# Patient Record
Sex: FEMALE | Race: WHITE | URBAN - NONMETROPOLITAN AREA
[De-identification: names, ages, dates, MRNs, and addresses within clinical notes are randomized per-mention and may not be internally consistent; named-entity substitution may affect disease eponyms.]

---

## 2019-01-01 ENCOUNTER — APPOINTMENT (OUTPATIENT)
Dept: GENERAL RADIOLOGY | Facility: OTHER | Age: 84
End: 2019-01-01
Attending: FAMILY MEDICINE
Payer: MEDICARE

## 2019-01-01 ENCOUNTER — HOSPITAL ENCOUNTER (EMERGENCY)
Facility: OTHER | Age: 84
Discharge: HOME | End: 2019-08-25
Attending: FAMILY MEDICINE | Admitting: FAMILY MEDICINE
Payer: MEDICARE

## 2019-01-01 ENCOUNTER — APPOINTMENT (OUTPATIENT)
Dept: GENERAL RADIOLOGY | Facility: OTHER | Age: 84
End: 2019-01-01
Attending: PHYSICIAN ASSISTANT
Payer: MEDICARE

## 2019-01-01 ENCOUNTER — HOSPITAL ENCOUNTER (EMERGENCY)
Facility: OTHER | Age: 84
Discharge: HOME | End: 2019-08-23
Attending: PHYSICIAN ASSISTANT | Admitting: PHYSICIAN ASSISTANT
Payer: MEDICARE

## 2019-01-01 VITALS
OXYGEN SATURATION: 94 % | DIASTOLIC BLOOD PRESSURE: 88 MMHG | WEIGHT: 151.3 LBS | BODY MASS INDEX: 25.83 KG/M2 | HEIGHT: 64 IN | HEART RATE: 73 BPM | TEMPERATURE: 98.1 F | SYSTOLIC BLOOD PRESSURE: 136 MMHG | RESPIRATION RATE: 7 BRPM

## 2019-01-01 VITALS
HEART RATE: 83 BPM | OXYGEN SATURATION: 95 % | SYSTOLIC BLOOD PRESSURE: 153 MMHG | BODY MASS INDEX: 25.75 KG/M2 | WEIGHT: 150 LBS | DIASTOLIC BLOOD PRESSURE: 74 MMHG | TEMPERATURE: 96.4 F | RESPIRATION RATE: 16 BRPM

## 2019-01-01 DIAGNOSIS — F03.90 SENILE DEMENTIA WITHOUT BEHAVIORAL DISTURBANCE (H): ICD-10-CM

## 2019-01-01 DIAGNOSIS — M62.81 MUSCLE WEAKNESS (GENERALIZED): ICD-10-CM

## 2019-01-01 DIAGNOSIS — R07.89 ATYPICAL CHEST PAIN: ICD-10-CM

## 2019-01-01 DIAGNOSIS — F05 SUNDOWNING: ICD-10-CM

## 2019-01-01 LAB
ALBUMIN SERPL-MCNC: 3.3 G/DL (ref 3.5–5.7)
ALBUMIN UR-MCNC: NEGATIVE MG/DL
ALP SERPL-CCNC: 82 U/L (ref 34–104)
ALT SERPL W P-5'-P-CCNC: 7 U/L (ref 7–52)
ANION GAP SERPL CALCULATED.3IONS-SCNC: 5 MMOL/L (ref 3–14)
ANION GAP SERPL CALCULATED.3IONS-SCNC: 5 MMOL/L (ref 3–14)
APPEARANCE UR: CLEAR
AST SERPL W P-5'-P-CCNC: 9 U/L (ref 13–39)
BACTERIA SPEC CULT: NORMAL
BASOPHILS # BLD AUTO: 0 10E9/L (ref 0–0.2)
BASOPHILS # BLD AUTO: 0.1 10E9/L (ref 0–0.2)
BASOPHILS NFR BLD AUTO: 0.4 %
BASOPHILS NFR BLD AUTO: 0.5 %
BILIRUB SERPL-MCNC: 0.3 MG/DL (ref 0.3–1)
BILIRUB UR QL STRIP: NEGATIVE
BUN SERPL-MCNC: 14 MG/DL (ref 7–25)
BUN SERPL-MCNC: 18 MG/DL (ref 7–25)
CALCIUM SERPL-MCNC: 8.9 MG/DL (ref 8.6–10.3)
CALCIUM SERPL-MCNC: 9 MG/DL (ref 8.6–10.3)
CHLORIDE SERPL-SCNC: 103 MMOL/L (ref 98–107)
CHLORIDE SERPL-SCNC: 104 MMOL/L (ref 98–107)
CO2 SERPL-SCNC: 27 MMOL/L (ref 21–31)
CO2 SERPL-SCNC: 29 MMOL/L (ref 21–31)
COLOR UR AUTO: YELLOW
CREAT SERPL-MCNC: 0.73 MG/DL (ref 0.6–1.2)
CREAT SERPL-MCNC: 0.73 MG/DL (ref 0.6–1.2)
DIFFERENTIAL METHOD BLD: ABNORMAL
DIFFERENTIAL METHOD BLD: NORMAL
EOSINOPHIL # BLD AUTO: 0.1 10E9/L (ref 0–0.7)
EOSINOPHIL # BLD AUTO: 0.1 10E9/L (ref 0–0.7)
EOSINOPHIL NFR BLD AUTO: 0.9 %
EOSINOPHIL NFR BLD AUTO: 1.2 %
ERYTHROCYTE [DISTWIDTH] IN BLOOD BY AUTOMATED COUNT: 13.4 % (ref 10–15)
ERYTHROCYTE [DISTWIDTH] IN BLOOD BY AUTOMATED COUNT: 13.5 % (ref 10–15)
GFR SERPL CREATININE-BSD FRML MDRD: 75 ML/MIN/{1.73_M2}
GFR SERPL CREATININE-BSD FRML MDRD: 75 ML/MIN/{1.73_M2}
GLUCOSE SERPL-MCNC: 125 MG/DL (ref 70–105)
GLUCOSE SERPL-MCNC: 94 MG/DL (ref 70–105)
GLUCOSE UR STRIP-MCNC: NEGATIVE MG/DL
HCT VFR BLD AUTO: 42.4 % (ref 35–47)
HCT VFR BLD AUTO: 43.4 % (ref 35–47)
HGB BLD-MCNC: 13.8 G/DL (ref 11.7–15.7)
HGB BLD-MCNC: 14 G/DL (ref 11.7–15.7)
HGB UR QL STRIP: NEGATIVE
IMM GRANULOCYTES # BLD: 0.1 10E9/L (ref 0–0.4)
IMM GRANULOCYTES # BLD: 0.1 10E9/L (ref 0–0.4)
IMM GRANULOCYTES NFR BLD: 1.1 %
IMM GRANULOCYTES NFR BLD: 1.2 %
INR PPP: 1.09 (ref 0–1.3)
INR PPP: 1.12 (ref 0–1.3)
KETONES UR STRIP-MCNC: NEGATIVE MG/DL
LACTATE SERPL-SCNC: 0.9 MMOL/L (ref 0.5–2.2)
LEUKOCYTE ESTERASE UR QL STRIP: NEGATIVE
LYMPHOCYTES # BLD AUTO: 1.9 10E9/L (ref 0.8–5.3)
LYMPHOCYTES # BLD AUTO: 2.2 10E9/L (ref 0.8–5.3)
LYMPHOCYTES NFR BLD AUTO: 19.3 %
LYMPHOCYTES NFR BLD AUTO: 19.9 %
MCH RBC QN AUTO: 30.8 PG (ref 26.5–33)
MCH RBC QN AUTO: 31 PG (ref 26.5–33)
MCHC RBC AUTO-ENTMCNC: 32.3 G/DL (ref 31.5–36.5)
MCHC RBC AUTO-ENTMCNC: 32.5 G/DL (ref 31.5–36.5)
MCV RBC AUTO: 95 FL (ref 78–100)
MCV RBC AUTO: 95 FL (ref 78–100)
MONOCYTES # BLD AUTO: 0.8 10E9/L (ref 0–1.3)
MONOCYTES # BLD AUTO: 0.9 10E9/L (ref 0–1.3)
MONOCYTES NFR BLD AUTO: 6.7 %
MONOCYTES NFR BLD AUTO: 9.1 %
NEUTROPHILS # BLD AUTO: 6.6 10E9/L (ref 1.6–8.3)
NEUTROPHILS # BLD AUTO: 8 10E9/L (ref 1.6–8.3)
NEUTROPHILS NFR BLD AUTO: 68.2 %
NEUTROPHILS NFR BLD AUTO: 71.5 %
NITRATE UR QL: NEGATIVE
NT-PROBNP SERPL-MCNC: 141 PG/ML (ref 0–100)
PH UR STRIP: 6.5 PH (ref 5–9)
PLATELET # BLD AUTO: 198 10E9/L (ref 150–450)
PLATELET # BLD AUTO: 223 10E9/L (ref 150–450)
POTASSIUM SERPL-SCNC: 4.5 MMOL/L (ref 3.5–5.1)
POTASSIUM SERPL-SCNC: 4.6 MMOL/L (ref 3.5–5.1)
PROT SERPL-MCNC: 5.4 G/DL (ref 6.4–8.9)
RBC # BLD AUTO: 4.45 10E12/L (ref 3.8–5.2)
RBC # BLD AUTO: 4.55 10E12/L (ref 3.8–5.2)
SODIUM SERPL-SCNC: 136 MMOL/L (ref 134–144)
SODIUM SERPL-SCNC: 137 MMOL/L (ref 134–144)
SOURCE: NORMAL
SP GR UR STRIP: 1.01 (ref 1–1.03)
SPECIMEN SOURCE: NORMAL
TROPONIN I SERPL-MCNC: <0.03 UG/L (ref 0–0.03)
TSH SERPL DL<=0.05 MIU/L-ACNC: 2.54 IU/ML (ref 0.34–5.6)
UROBILINOGEN UR STRIP-ACNC: 0.2 EU/DL (ref 0.2–1)
WBC # BLD AUTO: 11.2 10E9/L (ref 4–11)
WBC # BLD AUTO: 9.7 10E9/L (ref 4–11)

## 2019-01-01 PROCEDURE — 84484 ASSAY OF TROPONIN QUANT: CPT | Performed by: PHYSICIAN ASSISTANT

## 2019-01-01 PROCEDURE — 85610 PROTHROMBIN TIME: CPT | Performed by: FAMILY MEDICINE

## 2019-01-01 PROCEDURE — 87086 URINE CULTURE/COLONY COUNT: CPT | Performed by: FAMILY MEDICINE

## 2019-01-01 PROCEDURE — 93005 ELECTROCARDIOGRAM TRACING: CPT | Performed by: FAMILY MEDICINE

## 2019-01-01 PROCEDURE — 84484 ASSAY OF TROPONIN QUANT: CPT | Mod: 91 | Performed by: PHYSICIAN ASSISTANT

## 2019-01-01 PROCEDURE — 85025 COMPLETE CBC W/AUTO DIFF WBC: CPT | Performed by: PHYSICIAN ASSISTANT

## 2019-01-01 PROCEDURE — 85610 PROTHROMBIN TIME: CPT | Performed by: PHYSICIAN ASSISTANT

## 2019-01-01 PROCEDURE — 83880 ASSAY OF NATRIURETIC PEPTIDE: CPT | Performed by: PHYSICIAN ASSISTANT

## 2019-01-01 PROCEDURE — 93010 ELECTROCARDIOGRAM REPORT: CPT | Performed by: INTERNAL MEDICINE

## 2019-01-01 PROCEDURE — 99283 EMERGENCY DEPT VISIT LOW MDM: CPT | Mod: Z6 | Performed by: FAMILY MEDICINE

## 2019-01-01 PROCEDURE — 36415 COLL VENOUS BLD VENIPUNCTURE: CPT | Performed by: PHYSICIAN ASSISTANT

## 2019-01-01 PROCEDURE — 84484 ASSAY OF TROPONIN QUANT: CPT | Performed by: FAMILY MEDICINE

## 2019-01-01 PROCEDURE — 99285 EMERGENCY DEPT VISIT HI MDM: CPT | Mod: 25 | Performed by: PHYSICIAN ASSISTANT

## 2019-01-01 PROCEDURE — 81003 URINALYSIS AUTO W/O SCOPE: CPT | Mod: XU | Performed by: FAMILY MEDICINE

## 2019-01-01 PROCEDURE — 25000132 ZZH RX MED GY IP 250 OP 250 PS 637: Mod: GY | Performed by: PHYSICIAN ASSISTANT

## 2019-01-01 PROCEDURE — 71045 X-RAY EXAM CHEST 1 VIEW: CPT

## 2019-01-01 PROCEDURE — 80053 COMPREHEN METABOLIC PANEL: CPT | Performed by: PHYSICIAN ASSISTANT

## 2019-01-01 PROCEDURE — 99283 EMERGENCY DEPT VISIT LOW MDM: CPT | Mod: Z6 | Performed by: PHYSICIAN ASSISTANT

## 2019-01-01 PROCEDURE — 80048 BASIC METABOLIC PNL TOTAL CA: CPT | Performed by: FAMILY MEDICINE

## 2019-01-01 PROCEDURE — 81003 URINALYSIS AUTO W/O SCOPE: CPT | Performed by: FAMILY MEDICINE

## 2019-01-01 PROCEDURE — 36415 COLL VENOUS BLD VENIPUNCTURE: CPT | Mod: 91,GZ | Performed by: PHYSICIAN ASSISTANT

## 2019-01-01 PROCEDURE — 99285 EMERGENCY DEPT VISIT HI MDM: CPT | Mod: 25 | Performed by: FAMILY MEDICINE

## 2019-01-01 PROCEDURE — 36415 COLL VENOUS BLD VENIPUNCTURE: CPT | Performed by: FAMILY MEDICINE

## 2019-01-01 PROCEDURE — 85025 COMPLETE CBC W/AUTO DIFF WBC: CPT | Performed by: FAMILY MEDICINE

## 2019-01-01 PROCEDURE — 83605 ASSAY OF LACTIC ACID: CPT | Performed by: FAMILY MEDICINE

## 2019-01-01 PROCEDURE — 84443 ASSAY THYROID STIM HORMONE: CPT | Performed by: PHYSICIAN ASSISTANT

## 2019-01-01 PROCEDURE — 93005 ELECTROCARDIOGRAM TRACING: CPT | Performed by: PHYSICIAN ASSISTANT

## 2019-01-01 RX ORDER — CITALOPRAM HYDROBROMIDE 20 MG/1
20 TABLET ORAL
COMMUNITY
Start: 2017-10-13

## 2019-01-01 RX ORDER — MEMANTINE HYDROCHLORIDE 5 MG/1
5 TABLET ORAL
COMMUNITY
Start: 2019-01-01

## 2019-01-01 RX ORDER — DONEPEZIL HYDROCHLORIDE 10 MG/1
10 TABLET, FILM COATED ORAL
COMMUNITY
Start: 2018-05-25

## 2019-01-01 RX ORDER — ATENOLOL 50 MG/1
50 TABLET ORAL
COMMUNITY
Start: 2018-04-03

## 2019-01-01 RX ORDER — TRAZODONE HYDROCHLORIDE 50 MG/1
50 TABLET, FILM COATED ORAL
COMMUNITY
Start: 2019-01-01

## 2019-01-01 RX ORDER — DULOXETIN HYDROCHLORIDE 20 MG/1
20 CAPSULE, DELAYED RELEASE ORAL
COMMUNITY
Start: 2019-01-01

## 2019-01-01 RX ORDER — DIVALPROEX SODIUM 125 MG/1
250 CAPSULE, COATED PELLETS ORAL
COMMUNITY
Start: 2019-01-01

## 2019-01-01 RX ORDER — MIRTAZAPINE 15 MG/1
15 TABLET, FILM COATED ORAL
COMMUNITY
Start: 2019-01-01

## 2019-01-01 RX ORDER — LORAZEPAM 0.5 MG/1
0.5 TABLET ORAL EVERY 6 HOURS PRN
Qty: 10 TABLET | Refills: 0 | Status: SHIPPED | OUTPATIENT
Start: 2019-01-01

## 2019-01-01 RX ORDER — RISPERIDONE 0.25 MG/1
TABLET ORAL
COMMUNITY
Start: 2019-01-01

## 2019-01-01 RX ORDER — LORAZEPAM 0.5 MG/1
0.5 TABLET ORAL ONCE
Status: COMPLETED | OUTPATIENT
Start: 2019-01-01 | End: 2019-01-01

## 2019-01-01 RX ADMIN — LORAZEPAM 0.5 MG: 0.5 TABLET ORAL at 18:46

## 2019-01-01 ASSESSMENT — ENCOUNTER SYMPTOMS
SHORTNESS OF BREATH: 0
ADENOPATHY: 0
SORE THROAT: 0
VOMITING: 0
CHEST TIGHTNESS: 1
BRUISES/BLEEDS EASILY: 0
CONSTIPATION: 0
CONFUSION: 0
HEMATURIA: 0
DIARRHEA: 0
WOUND: 0
LIGHT-HEADEDNESS: 0
CHILLS: 0
RHINORRHEA: 0
FEVER: 0
HEADACHES: 0
DIZZINESS: 0
FACIAL SWELLING: 0
BACK PAIN: 0
TROUBLE SWALLOWING: 0
NAUSEA: 0
ABDOMINAL PAIN: 0

## 2019-01-01 ASSESSMENT — MIFFLIN-ST. JEOR: SCORE: 1096.29

## 2019-02-07 ENCOUNTER — RECORDS - HEALTHEAST (OUTPATIENT)
Dept: LAB | Facility: CLINIC | Age: 84
End: 2019-02-07

## 2019-02-07 LAB
ANION GAP SERPL CALCULATED.3IONS-SCNC: 7 MMOL/L (ref 5–18)
BUN SERPL-MCNC: 15 MG/DL (ref 8–28)
CALCIUM SERPL-MCNC: 9.6 MG/DL (ref 8.5–10.5)
CHLORIDE BLD-SCNC: 103 MMOL/L (ref 98–107)
CO2 SERPL-SCNC: 29 MMOL/L (ref 22–31)
CREAT SERPL-MCNC: 0.85 MG/DL (ref 0.6–1.1)
ERYTHROCYTE [DISTWIDTH] IN BLOOD BY AUTOMATED COUNT: 13 % (ref 11–14.5)
GFR SERPL CREATININE-BSD FRML MDRD: >60 ML/MIN/1.73M2
GLUCOSE BLD-MCNC: 85 MG/DL (ref 70–125)
HCT VFR BLD AUTO: 43.2 % (ref 35–47)
HGB BLD-MCNC: 13.6 G/DL (ref 12–16)
MCH RBC QN AUTO: 30.4 PG (ref 27–34)
MCHC RBC AUTO-ENTMCNC: 31.5 G/DL (ref 32–36)
MCV RBC AUTO: 96 FL (ref 80–100)
PLATELET # BLD AUTO: 222 THOU/UL (ref 140–440)
PMV BLD AUTO: 10.1 FL (ref 8.5–12.5)
POTASSIUM BLD-SCNC: 4.8 MMOL/L (ref 3.5–5)
RBC # BLD AUTO: 4.48 MILL/UL (ref 3.8–5.4)
SODIUM SERPL-SCNC: 139 MMOL/L (ref 136–145)
TSH SERPL DL<=0.005 MIU/L-ACNC: 2.77 UIU/ML (ref 0.3–5)
VIT B12 SERPL-MCNC: 1005 PG/ML (ref 213–816)
WBC: 9.9 THOU/UL (ref 4–11)

## 2019-03-19 ENCOUNTER — RECORDS - HEALTHEAST (OUTPATIENT)
Dept: LAB | Facility: CLINIC | Age: 84
End: 2019-03-19

## 2019-03-19 LAB — TSH SERPL DL<=0.005 MIU/L-ACNC: 2.08 UIU/ML (ref 0.3–5)

## 2019-03-20 ENCOUNTER — RECORDS - HEALTHEAST (OUTPATIENT)
Dept: LAB | Facility: CLINIC | Age: 84
End: 2019-03-20

## 2019-03-20 LAB
ALBUMIN UR-MCNC: ABNORMAL MG/DL
APPEARANCE UR: CLEAR
BACTERIA #/AREA URNS HPF: ABNORMAL HPF
BILIRUB UR QL STRIP: NEGATIVE
COLOR UR AUTO: ABNORMAL
GLUCOSE UR STRIP-MCNC: NEGATIVE MG/DL
HGB UR QL STRIP: NEGATIVE
KETONES UR STRIP-MCNC: NEGATIVE MG/DL
LEUKOCYTE ESTERASE UR QL STRIP: ABNORMAL
NITRATE UR QL: NEGATIVE
PH UR STRIP: 6.5 [PH] (ref 4.5–8)
RBC #/AREA URNS AUTO: ABNORMAL HPF
SP GR UR STRIP: 1.01 (ref 1–1.03)
SQUAMOUS #/AREA URNS AUTO: ABNORMAL LPF
UROBILINOGEN UR STRIP-ACNC: ABNORMAL
WBC #/AREA URNS AUTO: ABNORMAL HPF
WBC CLUMPS #/AREA URNS HPF: ABNORMAL /[HPF]

## 2019-03-22 LAB — BACTERIA SPEC CULT: NO GROWTH

## 2019-08-23 NOTE — ED PROVIDER NOTES
History     Chief Complaint   Patient presents with     Chest Pain     This is a 89-year-old female who was eating today at [employer].  She was eating a cheeseburger with fries, when suddenly she felt a sharp pain in her left chest area and she clutched her chest.  She rated her pain is maybe a 5 and a 1-10 scale.  This seemed to resolve.  She denies any pain at this time.  Otherwise she normally resides at [facility] at home.  She is currently on Coumadin due to a history of recurrent DVT.            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 6/9/2011        Reza Milian PA-C  08/23/19 1850         CARDIOVASCULAR SCREENING; LDL GOAL LESS THAN 160 06/09/2011     Priority: Medium     Anticoagulation monitoring, INR range 2-3 06/09/2011     Priority: Medium     Recurrent DVT,s--recurrent DVT lf left lower extremity--unprovoked         Hypothyroidism      Priority: Medium     Cervical arthritis      Priority: Medium     Vertigo, intermittent      Priority: Medium     Seeing ENT          Past Medical History:    Past Medical History:   Diagnosis Date     Cervical arthritis      DVT  of lower extremity (deep venous thrombosis) (H)  and      Hypertension      Hypothyroidism      Mild major depression (H) 2009     Vertigo, intermittent        Past Surgical History:    Past Surgical History:   Procedure Laterality Date     APPENDECTOMY       CHOLECYSTECTOMY       GYN SURGERY      total hysterectomy      JOINT REPLACEMENT, HIP RT/LT      femoral neck fracture and replacement      JOINT REPLACEMTN, KNEE RT/LT      Joint Replacement knee RT     SURGICAL HISTORY OF -       Bilateral vein stripping for varicose veins     TONSILLECTOMY         Family History:    Family History   Problem Relation Age of Onset     Alzheimer Disease Mother      C.A.D. Father      Heart Disease Father          from heart attack- age 62     Blood Disease Father         blood clots     C.A.D. Sister      Arthritis Sister      Gastrointestinal Disease Sister         acid reflux       Social History:  Marital Status:   [4]  Social History     Tobacco Use     Smoking status: Former Smoker     Packs/day: 0.10     Years: 30.00     Pack years: 3.00     Types: Cigarettes     Last attempt to quit: 1979     Years since quittin.2     Smokeless tobacco: Never Used   Substance Use Topics     Alcohol use: Yes     Comment: occ     Drug use: No        Medications:      ascorbic acid (VITAMIN C) 500 MG tablet   ASPIRIN NOT PRESCRIBED, INTENTIONAL,   Calcium Carbonate (CALCIUM 600 PO)   cholecalciferol (VITAMIN D) 400 UNIT TABS   levothyroxine (LEVOTHROID) 50 MCG tablet   Potassium 99 MG TABS   warfarin (COUMADIN) 5 MG tablet         Review of Systems   Constitutional: Negative for chills and fever.   HENT: Negative for congestion, facial swelling, rhinorrhea, sore throat and trouble swallowing.    Eyes: Negative for visual disturbance.   Respiratory: Positive for chest tightness. Negative for shortness of breath.    Cardiovascular: Positive for chest pain.   Gastrointestinal: Negative for abdominal pain,  "constipation, diarrhea, nausea and vomiting.   Genitourinary: Negative for hematuria.   Musculoskeletal: Negative for back pain.   Skin: Negative for rash and wound.   Neurological: Negative for dizziness, syncope, light-headedness and headaches.   Hematological: Negative for adenopathy. Does not bruise/bleed easily.   Psychiatric/Behavioral: Negative for confusion.       Physical Exam   BP: (!) 141/77  Pulse: 71  Heart Rate: 78  Temp: 98.1  F (36.7  C)  Resp: 16  Height: 162.6 cm (5' 4\")  Weight: 68.6 kg (151 lb 4.8 oz)  SpO2: 96 %      Physical Exam   Constitutional: She appears well-developed and well-nourished. No distress.   HENT:   Head: Normocephalic and atraumatic.   Eyes: Conjunctivae are normal. No scleral icterus.   Neck: Neck supple.   Cardiovascular: Normal rate and regular rhythm.   Pulmonary/Chest: Effort normal and breath sounds normal.   Lung sounds clear.  SaO2 is    on room air   Abdominal: Soft. There is no tenderness.   Musculoskeletal: She exhibits no deformity.   Lymphadenopathy:     She has no cervical adenopathy.   Neurological: She is alert.   Skin: Skin is warm and dry. No rash noted. She is not diaphoretic.   Psychiatric: She has a normal mood and affect.       ED Course        Procedures    EKG shows normal sinus rhythm heart rate of 82             Results for orders placed or performed during the hospital encounter of 08/23/19 (from the past 24 hour(s))   CBC with platelets differential   Result Value Ref Range    WBC 9.7 4.0 - 11.0 10e9/L    RBC Count 4.45 3.8 - 5.2 10e12/L    Hemoglobin 13.8 11.7 - 15.7 g/dL    Hematocrit 42.4 35.0 - 47.0 %    MCV 95 78 - 100 fl    MCH 31.0 26.5 - 33.0 pg    MCHC 32.5 31.5 - 36.5 g/dL    RDW 13.4 10.0 - 15.0 %    Platelet Count 223 150 - 450 10e9/L    Diff Method Automated Method     % Neutrophils 68.2 %    % Lymphocytes 19.9 %    % Monocytes 9.1 %    % Eosinophils 1.2 %    % Basophils 0.5 %    % Immature Granulocytes 1.1 %    Absolute Neutrophil 6.6 " 1.6 - 8.3 10e9/L    Absolute Lymphocytes 1.9 0.8 - 5.3 10e9/L    Absolute Monocytes 0.9 0.0 - 1.3 10e9/L    Absolute Eosinophils 0.1 0.0 - 0.7 10e9/L    Absolute Basophils 0.1 0.0 - 0.2 10e9/L    Abs Immature Granulocytes 0.1 0 - 0.4 10e9/L   INR   Result Value Ref Range    INR 1.12 0 - 1.3   Comprehensive metabolic panel   Result Value Ref Range    Sodium 136 134 - 144 mmol/L    Potassium 4.6 3.5 - 5.1 mmol/L    Chloride 104 98 - 107 mmol/L    Carbon Dioxide 27 21 - 31 mmol/L    Anion Gap 5 3 - 14 mmol/L    Glucose 125 (H) 70 - 105 mg/dL    Urea Nitrogen 18 7 - 25 mg/dL    Creatinine 0.73 0.60 - 1.20 mg/dL    GFR Estimate 75 >60 mL/min/[1.73_m2]    GFR Estimate If Black >90 >60 mL/min/[1.73_m2]    Calcium 8.9 8.6 - 10.3 mg/dL    Bilirubin Total 0.3 0.3 - 1.0 mg/dL    Albumin 3.3 (L) 3.5 - 5.7 g/dL    Protein Total 5.4 (L) 6.4 - 8.9 g/dL    Alkaline Phosphatase 82 34 - 104 U/L    ALT 7 7 - 52 U/L    AST 9 (L) 13 - 39 U/L   Troponin I   Result Value Ref Range    Troponin I ES <0.030 0.000 - 0.034 ug/L   Nt probnp inpatient (BNP)   Result Value Ref Range    N-Terminal Pro BNP Inpatient 141 (H) 0 - 100 pg/mL   TSH Reflex GH   Result Value Ref Range    TSH Reflex 2.54 0.34 - 5.60 IU/mL   XR Chest Port 1 View    Narrative    PROCEDURE:  XR CHEST PORT 1 VW    HISTORY:  atypical chest pain.     COMPARISON:  CT chest 6/14/2011    FINDINGS:   The cardiac silhouette is normal in size. The pulmonary vasculature is  normal.  The lungs are clear. No pleural effusion or pneumothorax.  There is a large hiatal hernia.      Impression    IMPRESSION:  No acute cardiopulmonary disease.      DARLENE TERRELL MD   Troponin I (second draw)   Result Value Ref Range    Troponin I ES <0.030 0.000 - 0.034 ug/L       Medications   LORazepam (ATIVAN) tablet 0.5 mg (0.5 mg Oral Given 8/23/19 1846)       Assessments & Plan (with Medical Decision Making)     I have reviewed the nursing notes.    I have reviewed the findings, diagnosis, plan and  need for follow up with the patient.      New Prescriptions    LORAZEPAM (ATIVAN) 0.5 MG TABLET    Take 1 tablet (0.5 mg) by mouth every 6 hours as needed for agitation       Final diagnoses:   Atypical chest pain   Sundowning     Afebrile.  Vital signs stable.  Patient with sudden onset of epigastric/chest pain while eating a [employer].  IV held off at this time.  All her symptoms have returned to normal.  EKG shows normal sinus rhythm.  Initial troponin is normal.  TSH is normal.  INR is normal.  BNP is minimally elevated 141.  CMP is unremarkable.  Chest x-ray is unremarkable.  Her 90-minute troponin returns normal as well which is very reassuring.  However the patient does have some dementia and sundowners and has become somewhat agitated.  She was given oral Ativan in the ER and Rx for short course as well if needed.  Follow-up with her primary care provider for further evaluation should her symptoms this sooner if there is any other concerns problems or questions.  8/23/2019   [facility] AND [facility]

## 2019-08-23 NOTE — ED TRIAGE NOTES
Pt arrives to the ED via private car.  Pt resides at [facility].  Staff called to say the pt's dtr was bring her in because of chest pain.  Pt was eating lunch at [facility] and per the dtr was grabbing her chest.  VSS stable for staff when she got back but stated her left sided chest pain was a 5/10.  Pt does have a heart hx per staff.

## 2019-08-23 NOTE — ED AVS SNAPSHOT
[facility]  [address]  [place] [address]  Phone:  [phone]  Fax:  [phone]                                    Maricarmen Yang   MRN: [id number]    Department:  [facility]   Date of Visit:  8/23/2019           After Visit Summary Signature Page    I have received my discharge instructions, and my questions have been answered. I have discussed any challenges I see with this plan with the nurse or doctor.    ..........................................................................................................................................  Patient/Patient Representative Signature      ..........................................................................................................................................  Patient Representative Print Name and Relationship to Patient    ..................................................               ................................................  Date                                   Time    ..........................................................................................................................................  Reviewed by Signature/Title    ...................................................              ..............................................  Date                                               Time          22EPIC Rev 08/18

## 2019-08-25 NOTE — ED TRIAGE NOTES
Pt arrives to ED via EMS from [facility]. Per EMS, pt not eating or drinking, not been acting right, increasingly weak. VSS at facility. Pt also has hx dementia.  Mahi Hoffmann RN

## 2019-08-25 NOTE — ED AVS SNAPSHOT
[facility]  [address]  [place] [address]  Phone:  [phone]  Fax:  [phone]                                    Maricarmen Yang   MRN: [id number]    Department:  [facility]   Date of Visit:  8/25/2019           After Visit Summary Signature Page    I have received my discharge instructions, and my questions have been answered. I have discussed any challenges I see with this plan with the nurse or doctor.    ..........................................................................................................................................  Patient/Patient Representative Signature      ..........................................................................................................................................  Patient Representative Print Name and Relationship to Patient    ..................................................               ................................................  Date                                   Time    ..........................................................................................................................................  Reviewed by Signature/Title    ...................................................              ..............................................  Date                                               Time          22EPIC Rev 08/18

## 2019-08-25 NOTE — ED PROVIDER NOTES
History     Chief Complaint   Patient presents with     Altered Mental Status     HPI  Maricarmen Yang is a 89 year old female with a history of dementia who presents from [facility] with possible altered mental status.  Patient stated she just did not want to eat breakfast and so she stayed in bed and tried to sleep when they made her come to be seen here in the emergency room.  She is not complaining of any obvious symptoms.  Only information we got from the facility was that she was having increased weakness not eating or drinking.    She was just seen here on Friday after she was at [employer] having something to eat and had a sudden onset of chest pain.  No obvious abnormality was found at that time.    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 6/9/2011        Dale Gomes MD  08/25/19 1337         CARDIOVASCULAR SCREENING; LDL GOAL LESS THAN 160 06/09/2011     Priority: Medium     Anticoagulation monitoring, INR range 2-3 06/09/2011     Priority: Medium     Recurrent DVT,s--recurrent DVT lf left lower extremity--unprovoked         Hypothyroidism      Priority: Medium      Cervical arthritis      Priority: Medium     Vertigo, intermittent      Priority: Medium     Seeing ENT          Past Medical History:    Past Medical History:   Diagnosis Date     Cervical arthritis      DVT of lower extremity (deep venous thrombosis) (H)  and      Hypertension      Hypothyroidism      Mild major depression (H)      Vertigo, intermittent        Past Surgical History:    Past Surgical History:   Procedure Laterality Date     APPENDECTOMY       CHOLECYSTECTOMY       GYN SURGERY      total hysterectomy      JOINT REPLACEMENT, HIP RT/LT      femoral neck fracture and replacement      JOINT REPLACEMTN, KNEE RT/LT      Joint Replacement knee RT     SURGICAL HISTORY OF -       Bilateral vein stripping for varicose veins     TONSILLECTOMY         Family History:    Family History   Problem Relation Age of Onset     Alzheimer Disease Mother      C.A.D. Father      Heart Disease Father          from heart attack- age 62     Blood Disease Father         blood clots     C.A.D. Sister      Arthritis Sister      Gastrointestinal Disease Sister         acid reflux       Social History:  Marital Status:   [4]  Social History     Tobacco Use     Smoking status: Former Smoker     Packs/day: 0.10     Years: 30.00     Pack years: 3.00     Types: Cigarettes     Last attempt to quit: 1979     Years since quittin.2     Smokeless tobacco: Never Used   Substance Use Topics     Alcohol use: Yes     Comment: occ     Drug use: No        Medications:      atenolol (TENORMIN) 50 MG tablet   citalopram (CELEXA) 20 MG tablet   divalproex sodium delayed-release (DEPAKOTE SPRINKLE) 125 MG DR capsule   donepezil (ARICEPT) 10 MG tablet   DULoxetine (CYMBALTA) 20 MG capsule   levothyroxine (LEVOTHROID) 50 MCG tablet   LORazepam (ATIVAN) 0.5 MG tablet   memantine (NAMENDA) 5 MG tablet   mirtazapine (REMERON) 15 MG tablet   ranitidine (ZANTAC) 150 MG tablet   risperiDONE (RISPERDAL) 0.25 MG tablet  "  traZODone (DESYREL) 50 MG tablet   ascorbic acid (VITAMIN C) 500 MG tablet   ASPIRIN NOT PRESCRIBED, INTENTIONAL,   Calcium Carbonate (CALCIUM 600 PO)   cholecalciferol (VITAMIN D) 400 UNIT TABS   Potassium 99 MG TABS   warfarin (COUMADIN) 5 MG tablet         Review of Systems   Unable to perform ROS: Dementia   All other systems reviewed and are negative.  Patient states \" I don't know why I am here- I just want to sleep!\"     Physical Exam   BP: (!) 150/80  Pulse: 80  Temp: 96.4  F (35.8  C)  Resp: 16  Weight: 68 kg (150 lb)  SpO2: 96 %      Physical Exam   Constitutional: She appears well-developed and well-nourished.  Non-toxic appearance. She does not appear ill. No distress.   HENT:   Head: Normocephalic and atraumatic.   Mouth/Throat: Oropharynx is clear and moist.   Eyes: Pupils are equal, round, and reactive to light. EOM are normal. Right eye exhibits normal extraocular motion and no nystagmus. Left eye exhibits normal extraocular motion and no nystagmus. Right pupil is reactive. Left pupil is reactive.   Neck: Normal range of motion. Neck supple. No tracheal deviation present.   Cardiovascular: Normal rate, regular rhythm and normal heart sounds.   Pulmonary/Chest: Effort normal and breath sounds normal. No stridor. No respiratory distress.   Abdominal: Soft. Bowel sounds are normal.   Musculoskeletal: Normal range of motion. She exhibits no edema or tenderness.   Lymphadenopathy:     She has no cervical adenopathy.   Neurological: She is alert.   Skin: Skin is warm and dry. Capillary refill takes less than 2 seconds. She is not diaphoretic. No cyanosis.   Nursing note and vitals reviewed.      ED Course   Patient seen and examined.  She denies any symptoms at present.  She stated that she is basically wanted to stay in bed and that is why they sent her over here.    Patient's daughter arrived and stated that she was called yesterday because her mom was more fatigued and weak and did not want to get out " of bed all day.  When she would get out of bed and had breakfast this morning she again was called in rec they recommended transport to the ER.  When she has a borderline UTI she usually starts having symptoms like that.  ED Course as of Aug 25 1337   Sun Aug 25, 2019   1326 CXR - no change   XR Chest 1 View   Discussed negative work-up with patient's daughter.  She is comfortable with discharge back to the nursing facility.  Patient ate a good lunch and is interacting appropriately.  Family has noted that she has had more difficulty with eating consistently and have been providing her with small snack like meals.  I suggested that they continue to encourage her to eat small amounts frequently and that at times she may not want to go to eat at the facility.  They keep track of her weight and is not downtrending she is probably getting adequate nutrition.  Also warned the family that sometimes with dementia they will start to decide that they are not going to eat and there is alcohol whole lot we can do about it is/is not some medical reason.    Urine was sent for urine culture she seemed to have some urinary retention when she first got here but she was able to urinate easily at the end of the visit.  I do not feel that putting her on antibiotics to be appropriate until we have a culture back that is positive.  Family was comfortable with that plan.  Discharged back to the nursing facility with her daughter.  Procedures               EKG Interpretation:      Interpreted by Dale Gomes MD  Time reviewed: 10:42  Symptoms at time of EKG: altered LOC   Rhythm: normal sinus   Rate: normal  Axis: normal  Ectopy: none  Conduction: normal  ST Segments/ T Waves: No ST-T wave changes  Q Waves: none  Comparison to prior: prior unavailable    Clinical Impression: normal EKG    Results for orders placed or performed during the hospital encounter of 08/25/19 (from the past 24 hour(s))   CBC with platelets differential   Result  Value Ref Range    WBC 11.2 (H) 4.0 - 11.0 10e9/L    RBC Count 4.55 3.8 - 5.2 10e12/L    Hemoglobin 14.0 11.7 - 15.7 g/dL    Hematocrit 43.4 35.0 - 47.0 %    MCV 95 78 - 100 fl    MCH 30.8 26.5 - 33.0 pg    MCHC 32.3 31.5 - 36.5 g/dL    RDW 13.5 10.0 - 15.0 %    Platelet Count 198 150 - 450 10e9/L    Diff Method Automated Method     % Neutrophils 71.5 %    % Lymphocytes 19.3 %    % Monocytes 6.7 %    % Eosinophils 0.9 %    % Basophils 0.4 %    % Immature Granulocytes 1.2 %    Absolute Neutrophil 8.0 1.6 - 8.3 10e9/L    Absolute Lymphocytes 2.2 0.8 - 5.3 10e9/L    Absolute Monocytes 0.8 0.0 - 1.3 10e9/L    Absolute Eosinophils 0.1 0.0 - 0.7 10e9/L    Absolute Basophils 0.0 0.0 - 0.2 10e9/L    Abs Immature Granulocytes 0.1 0 - 0.4 10e9/L   INR   Result Value Ref Range    INR 1.09 0 - 1.3   Basic metabolic panel   Result Value Ref Range    Sodium 137 134 - 144 mmol/L    Potassium 4.5 3.5 - 5.1 mmol/L    Chloride 103 98 - 107 mmol/L    Carbon Dioxide 29 21 - 31 mmol/L    Anion Gap 5 3 - 14 mmol/L    Glucose 94 70 - 105 mg/dL    Urea Nitrogen 14 7 - 25 mg/dL    Creatinine 0.73 0.60 - 1.20 mg/dL    GFR Estimate 75 >60 mL/min/[1.73_m2]    GFR Estimate If Black >90 >60 mL/min/[1.73_m2]    Calcium 9.0 8.6 - 10.3 mg/dL   Troponin I   Result Value Ref Range    Troponin I ES <0.030 0.000 - 0.034 ug/L   Lactic acid   Result Value Ref Range    Lactic Acid 0.9 0.5 - 2.2 mmol/L   *UA reflex to Microscopic   Result Value Ref Range    Color Urine Yellow     Appearance Urine Clear     Glucose Urine Negative NEG^Negative mg/dL    Bilirubin Urine Negative NEG^Negative    Ketones Urine Negative NEG^Negative mg/dL    Specific Gravity Urine 1.015 1.000 - 1.030    Blood Urine Negative NEG^Negative    pH Urine 6.5 5.0 - 9.0 pH    Protein Albumin Urine Negative NEG^Negative mg/dL    Urobilinogen Urine 0.2 0.2 - 1.0 EU/dL    Nitrite Urine Negative NEG^Negative    Leukocyte Esterase Urine Negative NEG^Negative    Source Catheterized Urine         Medications - No data to display    Assessments & Plan (with Medical Decision Making)     I have reviewed the nursing notes.    I have reviewed the findings, diagnosis, plan and need for follow up with the patient.    New Prescriptions    No medications on file       Final diagnoses:   Senile dementia without behavioral disturbance   Muscle weakness (generalized)       8/25/2019   [facility] AND [facility]

## 2019-08-27 NOTE — RESULT ENCOUNTER NOTE
Final urine culture report is NEGATIVE per [facility] Lab Result protocol.    If NEGATIVE result, no change in treatment, per [facility] ED Lab Result protocol.

## (undated) RX ORDER — LORAZEPAM 0.5 MG/1
TABLET ORAL
Status: DISPENSED
Start: 2019-01-01